# Patient Record
Sex: FEMALE | Race: WHITE | Employment: OTHER | RURAL
[De-identification: names, ages, dates, MRNs, and addresses within clinical notes are randomized per-mention and may not be internally consistent; named-entity substitution may affect disease eponyms.]

---

## 2022-08-07 ENCOUNTER — HOSPITAL ENCOUNTER (EMERGENCY)
Age: 62
Discharge: HOME | End: 2022-08-07
Attending: FAMILY MEDICINE
Payer: COMMERCIAL

## 2022-08-07 VITALS
BODY MASS INDEX: 28.41 KG/M2 | RESPIRATION RATE: 14 BRPM | HEIGHT: 67 IN | SYSTOLIC BLOOD PRESSURE: 147 MMHG | WEIGHT: 181 LBS | DIASTOLIC BLOOD PRESSURE: 78 MMHG | OXYGEN SATURATION: 100 % | HEART RATE: 78 BPM

## 2022-08-07 DIAGNOSIS — S91.209A AVULSION OF TOENAIL, INITIAL ENCOUNTER: Primary | ICD-10-CM

## 2022-08-07 PROCEDURE — 99282 EMERGENCY DEPT VISIT SF MDM: CPT

## 2022-08-08 NOTE — ED PROVIDER NOTES
EMERGENCY DEPARTMENT HISTORY AND PHYSICAL EXAM          Date: 8/7/2022  Patient Name: Catracho Akins    History of Presenting Illness     Chief Complaint   Patient presents with    Nail Problem    Toe Injury     Left big toe jammed just PTA causing nail to bend back. Bleeding controlled       History Provided By: Patient    HPI: Catracho Akins is a 57 y.o. female, pmhx DMII c neuropathy, who was brought to the ED by her  after she injured her left great toenail. She was helping her  move a dock box and he pushed before she was ready. The box bent the nail back, and the medial aspect of the nail became avulsed. There was a fair amount of bleeding that has now been controlled, but there is a part of the nail that has come out of the bed. She does not feel that there is any possibility of a fx to the toe and does not feel that an Xray is necessary. PCP: Janet Ellington MD    Allergies: NKDA  Social Hx: No tobacco, vaping, occas EtOH, Illicit Drugs; Lives in [place] c her . There are no other complaints, changes, or physical findings at this time. Past History     Past Medical History:  History reviewed. No pertinent past medical history. Past Surgical History:  No past surgical history on file. Family History:  History reviewed. No pertinent family history. Social History: Allergies:  No Known Allergies      Review of Systems   Review of Systems   Skin:  Positive for wound. Hematological:  Bruises/bleeds easily. Physical Exam   Physical Exam  HENT:      Head: Normocephalic. Right Ear: External ear normal.      Left Ear: External ear normal.      Nose: Nose normal.      Mouth/Throat:      Mouth: Mucous membranes are moist.   Cardiovascular:      Rate and Rhythm: Normal rate. Pulmonary:      Effort: Pulmonary effort is normal.   Musculoskeletal:      Comments: Left great toe with medial avulsion. Base of nail exposed.    Skin:     General: Skin is warm and dry.   Neurological:      Mental Status: She is alert and oriented to person, place, and time. Psychiatric:         Behavior: Behavior normal.         Medical Decision Making   I am the first provider for this patient. I reviewed the vital signs, available nursing notes, past medical history, past surgical history, family history and social history. Vital Signs-Reviewed the patient's vital signs. Patient Vitals for the past 12 hrs:   Pulse Resp BP SpO2   08/07/22 1907 78 14 (!) 147/78 100 %         Records Reviewed: Nursing Notes and Old Medical Records    Provider Notes (Medical Decision Making):   MDM: 56 yo diabetic with nail avulsion. Could have fx of the toe as well as the injury to the nail, but patient does not feel the need for an Xray. ED Course:   Initial assessment performed. The patients presenting problems have been discussed, and they are in agreement with the care plan formulated and outlined with them. I have encouraged them to ask questions as they arise throughout their visit. PROGRESS NOTE:  After hemostasis achieved with direct pressure, the corner of the nail was trimmed and a dressing placed. Pt comfortable with discharge. Declined an Xray. Discharge note:  Pt re-evaluated and noted to be feeling better , ready for discharge. Will follow up as instructed. All questions have been answered, pt voiced understanding and agreement with plan. Specific return precautions provided as well as instructions to return to the ED should sx worsen at any time. Vital signs stable for discharge. Critical Care Time: 0      Diagnosis     Clinical Impression:   1. Avulsion of toenail, initial encounter        PLAN:  1. There are no discharge medications for this patient.     2.   Follow-up Information       Follow up With Specialties Details Why Contact Info    Girma Saravia [address]  [phone]            Return to ED if worse Disposition:  Home

## 2024-06-29 ENCOUNTER — HOSPITAL ENCOUNTER (EMERGENCY)
Facility: HOSPITAL | Age: 64
Discharge: HOME | End: 2024-06-29
Attending: EMERGENCY MEDICINE
Payer: MEDICARE

## 2024-06-29 VITALS
DIASTOLIC BLOOD PRESSURE: 83 MMHG | HEART RATE: 71 BPM | WEIGHT: 170 LBS | TEMPERATURE: 98.1 F | SYSTOLIC BLOOD PRESSURE: 117 MMHG | OXYGEN SATURATION: 97 % | BODY MASS INDEX: 26.68 KG/M2 | RESPIRATION RATE: 18 BRPM | HEIGHT: 67 IN

## 2024-06-29 DIAGNOSIS — S61.210A LACERATION OF RIGHT INDEX FINGER WITHOUT FOREIGN BODY, NAIL DAMAGE STATUS UNSPECIFIED, INITIAL ENCOUNTER: Primary | ICD-10-CM

## 2024-06-29 PROCEDURE — 90715 TDAP VACCINE 7 YRS/> IM: CPT | Performed by: EMERGENCY MEDICINE

## 2024-06-29 PROCEDURE — 99284 EMERGENCY DEPT VISIT MOD MDM: CPT

## 2024-06-29 PROCEDURE — 90471 IMMUNIZATION ADMIN: CPT | Performed by: EMERGENCY MEDICINE

## 2024-06-29 PROCEDURE — 6360000002 HC RX W HCPCS: Performed by: EMERGENCY MEDICINE

## 2024-06-29 PROCEDURE — 6370000000 HC RX 637 (ALT 250 FOR IP): Performed by: EMERGENCY MEDICINE

## 2024-06-29 RX ADMIN — Medication 1 EACH: at 12:51

## 2024-06-29 RX ADMIN — TETANUS TOXOID, REDUCED DIPHTHERIA TOXOID AND ACELLULAR PERTUSSIS VACCINE, ADSORBED 0.5 ML: 5; 2.5; 8; 8; 2.5 SUSPENSION INTRAMUSCULAR at 12:50

## 2024-06-29 ASSESSMENT — PAIN SCALES - GENERAL
PAINLEVEL_OUTOF10: 0
PAINLEVEL_OUTOF10: 0

## 2024-06-29 ASSESSMENT — PAIN - FUNCTIONAL ASSESSMENT: PAIN_FUNCTIONAL_ASSESSMENT: 0-10

## 2024-06-29 ASSESSMENT — LIFESTYLE VARIABLES
HOW OFTEN DO YOU HAVE A DRINK CONTAINING ALCOHOL: NEVER
HOW MANY STANDARD DRINKS CONTAINING ALCOHOL DO YOU HAVE ON A TYPICAL DAY: PATIENT DOES NOT DRINK

## 2024-06-29 NOTE — DISCHARGE INSTRUCTIONS
We used a Gelfoam to stop the bleeding on your fingers.  This will adhere to the wound.  Please leave the bandages in place until Monday night or Tuesday morning.     When you are ready to remove the Gelfoam, soak your hand in warm water and gently remove without tearing or ripping.    Come back to the emergency department immediately for any signs of infection such as redness, fever, pus.

## 2024-06-29 NOTE — ED TRIAGE NOTES
Pt arrived with c/o right pointer and third finger laceration with a butter knife. Unsure of last tetanus, is taking eliquis

## 2024-06-30 NOTE — ED PROVIDER NOTES
Physical Exam  Constitutional:       Appearance: Normal appearance.   Musculoskeletal:      Comments: Palmar surface of right hand with laceration along the crease of the PIP joint, 1.5 cm.  Laceration of 5 mm also noted on fourth digit, middle phalanx.  Both lacerations are oozing blood.  Not gaping.  Good cap refill of fingers, good distal sensation.  Normal radial pulse.   Neurological:      Mental Status: She is alert and oriented to person, place, and time.      Motor: No weakness.          DIAGNOSTIC RESULTS   LABS:     No results found for this or any previous visit (from the past 24 hour(s)).    EKG: If performed, independent interpretation documented below in the MDM section     RADIOLOGY:  Non-plain film images such as CT, Ultrasound and MRI are read by the radiologist. Plain radiographic images are visualized and preliminarily interpreted by the ED Provider with the findings documented in the MDM section.     Interpretation per the Radiologist below, if available at the time of this note:     No orders to display        PROCEDURES   Unless otherwise noted below, none  Procedures     EMERGENCY DEPARTMENT COURSE and DIFFERENTIAL DIAGNOSIS/MDM   Vitals:    Vitals:    06/29/24 1207   BP: 117/83   Pulse: 71   Resp: 18   Temp: 98.1 °F (36.7 °C)   SpO2: 97%   Weight: 77.1 kg (170 lb)   Height: 1.702 m (5' 7\")        Patient was given the following medications:  Medications   gelatin adsorbable (GELFOAM) sponge 1 each (1 each Apply externally Given 6/29/24 1251)   Tetanus-Diphth-Acell Pertussis (BOOSTRIX) injection 0.5 mL (0.5 mLs IntraMUSCular Given 6/29/24 1250)       Medical Decision Making  Risk  Prescription drug management.      63-year-old female with lacerations to fingers.  We discussed suturing/wound closure options.  Both wounds are well-approximated but oozing blood.  After discussion, we will proceed with Gelfoam and bulky dressing.  Patient instructed on management at home (removing Gelfoam,